# Patient Record
Sex: MALE | URBAN - NONMETROPOLITAN AREA
[De-identification: names, ages, dates, MRNs, and addresses within clinical notes are randomized per-mention and may not be internally consistent; named-entity substitution may affect disease eponyms.]

---

## 2024-10-24 ENCOUNTER — APPOINTMENT (OUTPATIENT)
Dept: URBAN - NONMETROPOLITAN AREA CLINIC 51 | Age: 44
Setting detail: DERMATOLOGY
End: 2024-10-24

## 2024-10-24 DIAGNOSIS — L72.0 EPIDERMAL CYST: ICD-10-CM

## 2024-10-24 DIAGNOSIS — L82.1 OTHER SEBORRHEIC KERATOSIS: ICD-10-CM

## 2024-10-24 PROCEDURE — OTHER DEFER: OTHER

## 2024-10-24 PROCEDURE — OTHER CONSULTATION EXCISION: OTHER

## 2024-10-24 PROCEDURE — OTHER PHOTO-DOCUMENTATION: OTHER

## 2024-10-24 PROCEDURE — 99203 OFFICE O/P NEW LOW 30 MIN: CPT

## 2024-10-24 PROCEDURE — OTHER COUNSELING: OTHER

## 2024-10-24 ASSESSMENT — LOCATION SIMPLE DESCRIPTION DERM
LOCATION SIMPLE: LEFT ZYGOMA
LOCATION SIMPLE: TRAPEZIAL NECK

## 2024-10-24 ASSESSMENT — LOCATION ZONE DERM
LOCATION ZONE: FACE
LOCATION ZONE: NECK

## 2024-10-24 ASSESSMENT — LOCATION DETAILED DESCRIPTION DERM
LOCATION DETAILED: MID TRAPEZIAL NECK
LOCATION DETAILED: LEFT CENTRAL ZYGOMA

## 2025-02-19 ENCOUNTER — APPOINTMENT (OUTPATIENT)
Dept: URBAN - NONMETROPOLITAN AREA CLINIC 51 | Age: 45
Setting detail: DERMATOLOGY
End: 2025-02-21

## 2025-02-19 DIAGNOSIS — L72.0 EPIDERMAL CYST: ICD-10-CM

## 2025-02-19 PROCEDURE — 11422 EXC H-F-NK-SP B9+MARG 1.1-2: CPT

## 2025-02-19 PROCEDURE — 12041 INTMD RPR N-HF/GENIT 2.5CM/<: CPT

## 2025-02-19 PROCEDURE — OTHER MIPS QUALITY: OTHER

## 2025-02-19 PROCEDURE — OTHER PHOTO-DOCUMENTATION: OTHER

## 2025-02-19 PROCEDURE — OTHER EXCISION: OTHER

## 2025-02-19 ASSESSMENT — LOCATION ZONE DERM: LOCATION ZONE: NECK

## 2025-02-19 ASSESSMENT — LOCATION DETAILED DESCRIPTION DERM: LOCATION DETAILED: MID TRAPEZIAL NECK

## 2025-02-19 ASSESSMENT — LOCATION SIMPLE DESCRIPTION DERM: LOCATION SIMPLE: TRAPEZIAL NECK

## 2025-02-19 NOTE — PROCEDURE: EXCISION

## 2025-03-05 ENCOUNTER — APPOINTMENT (OUTPATIENT)
Dept: URBAN - NONMETROPOLITAN AREA CLINIC 51 | Age: 45
Setting detail: DERMATOLOGY
End: 2025-03-05

## 2025-03-05 DIAGNOSIS — Z48.02 ENCOUNTER FOR REMOVAL OF SUTURES: ICD-10-CM

## 2025-03-05 PROCEDURE — OTHER SUTURE REMOVAL (GLOBAL PERIOD): OTHER

## 2025-03-05 PROCEDURE — OTHER MIPS QUALITY: OTHER

## 2025-03-05 PROCEDURE — OTHER PHOTO-DOCUMENTATION: OTHER

## 2025-03-05 PROCEDURE — 99024 POSTOP FOLLOW-UP VISIT: CPT

## 2025-03-05 PROCEDURE — OTHER COUNSELING: OTHER

## 2025-03-05 ASSESSMENT — LOCATION SIMPLE DESCRIPTION DERM
LOCATION SIMPLE: POSTERIOR NECK
LOCATION SIMPLE: POSTERIOR NECK

## 2025-03-05 ASSESSMENT — LOCATION ZONE DERM
LOCATION ZONE: NECK
LOCATION ZONE: NECK

## 2025-03-05 ASSESSMENT — LOCATION DETAILED DESCRIPTION DERM
LOCATION DETAILED: MID POSTERIOR NECK
LOCATION DETAILED: MID POSTERIOR NECK

## 2025-03-05 NOTE — PROCEDURE: SUTURE REMOVAL (GLOBAL PERIOD)
How Severe Are Your Spot(S)?: mild
Detail Level: Detailed
Add 87866 Cpt? (Important Note: In 2017 The Use Of 76154 Is Being Tracked By Cms To Determine Future Global Period Reimbursement For Global Periods): yes